# Patient Record
(demographics unavailable — no encounter records)

---

## 2017-01-18 NOTE — US
EXAMINATION TYPE: US kidneys/renal and bladder

 

DATE OF EXAM: 1/18/2017 3:45 PM

 

COMPARISON: NONE

 

CLINICAL HISTORY: 65-year-old female with renal failure.

 

TECHNIQUE: Multiple sonographic images of the kidneys and bladder were obtained.

 

FINDINGS:

 

Right Kidney:  10.4 x 5.1 x 5.1 cm without hydronephrosis. There is a cyst within the lower pole rolando
uring 1.7 cm. Some internal low-level echoes are felt to be artifactual.

 

Left Kidney: 9.8 x 4.9 x 4.9 cm without hydronephrosis. A simple cyst within the upper pole measures 
1.8 cm.

 

No gross abnormality of the urine distended bladder. Both ureteral jets are visualized.

 

 

 

IMPRESSION:

No hydronephrosis. A cyst within each kidney measures up to 1.8 cm.